# Patient Record
(demographics unavailable — no encounter records)

---

## 2025-07-30 NOTE — PHYSICAL EXAM
[Normal] : supple, no neck mass and thyroid not enlarged [Normal Neck Lymph Nodes] : normal neck lymph nodes  [Normal Supraclavicular Lymph Nodes] : normal supraclavicular lymph nodes [Normal Groin Lymph Nodes] : normal groin lymph nodes [Normal Axillary Lymph Nodes] : normal axillary lymph nodes [Normal] : oriented to person, place and time, with appropriate affect [de-identified] : no masses or adenopathy bilaterally.  us shows benign dilated ducts at area of concern in the left breast.  No active infection.

## 2025-07-30 NOTE — ASSESSMENT
[FreeTextEntry1] : S/p benign and concordant bilateral breast biopsy\par  Benign dilated ducts on final pathology \par  No active infection left breast\par  Continue yearly breast imaging November 2023\par  RTO 6 mos or prn\par  \par

## 2025-07-30 NOTE — PHYSICAL EXAM
[Normal] : supple, no neck mass and thyroid not enlarged [Normal Neck Lymph Nodes] : normal neck lymph nodes  [Normal Supraclavicular Lymph Nodes] : normal supraclavicular lymph nodes [Normal Groin Lymph Nodes] : normal groin lymph nodes [Normal Axillary Lymph Nodes] : normal axillary lymph nodes [Normal] : oriented to person, place and time, with appropriate affect [de-identified] : no masses or adenopathy bilaterally.  us shows benign dilated ducts at area of concern in the left breast.  No active infection.

## 2025-07-30 NOTE — HISTORY OF PRESENT ILLNESS
[de-identified] : Patient is a 52 y/o F who presents a follow up, last seen in February 2023.  She is s/p 3 site bilateral breast us-guided biopsy on 11/23/22, pathology was benign and concordant.   Recall L MMG/US 7/10/25 - No mammographic evidence of malignancy in the LEFT breast or suspicious sonographic abnormality in the lower inner quadrant or retroareolar location of the LEFT breast. Duct ectasia with intraductal debris in the patient directed area of concern in the LEFT breast 2:00 position. No localized fluid collection to suggest abscess. Recommend follow-up targeted LEFT breast ultrasound in 6 weeks to reevaluate for resolution of clinical symptoms. The patient is currently self treating for infection. Recommend clinical follow-up with further management based on clinical assessment. (BIRADS 3)  Screening MMG/US 5/16/25 - Nodular asymmetry left inferior breast MLO projection and sonographic left retroareolar mass for which additional mammographic and sonographic imaging is recommended. No right mammographic or sonographic malignancy. (TC: 28.0%, BIRADS 0)  Biopsy (11/23/22): 1. Breast, right, 12 o'clock retroareolar, ultrasound-guided core biopsy: - Fibrofatty breast tissue with fragments of benign cyst wall/dilated duct. 2. Breast, left, 3 o'clock 7 cm FN, ultrasound-guided core biopsy: - Fibrofatty breast tissue with fragments of benign cyst wall/dilated duct. - Fragments of duct ectasia. 3. Breast, left, 11 o'clock-1 o'clock retroareolar, ultrasound-guided core biopsy: - Breast tissue with marked acute inflammation, histiocytes and areas of necrosis.  FNA (11/23/22):  BREAST, LEFT, 11-1 O'CLOCK, FNA NEGATIVE FOR MALIGNANT CELLS  Screening bilateral mammo/sono (11/18/22):  At the palpable area of concern at the 11:00-1:00 retroareolar left breast region on sonography, multiple dilated ducts are seen containing internal material, along with a conglomerate mass of ducts and internal ductal material. While at least some of this material represents fluid, intraductal solid mass cannot entirely be excluded, particularly in the conglomerate mass mentioned above. US-guided core biopsy of this conglomerate mass is recommended.Intraductal appearing nodules by sonography at the 3:00 left breast and 12:00 right breast for which us-guided core biopsy is recommended respectively, as described above. The nodule at 3:00 on the left is also seen radiographically. No radiographic evidence of malignancy and no significant radiographic change on the right. Clinical follow-up is recommended to confirm complete resolution of the patient's symptoms of burning, redness and pain involving the left breast. (BI-RADS 4A)  Denies any family history of breast cancer.

## 2025-07-30 NOTE — HISTORY OF PRESENT ILLNESS
[de-identified] : Patient is a 52 y/o F who presents a follow up, last seen in February 2023.  She is s/p 3 site bilateral breast us-guided biopsy on 11/23/22, pathology was benign and concordant.   Recall L MMG/US 7/10/25 - No mammographic evidence of malignancy in the LEFT breast or suspicious sonographic abnormality in the lower inner quadrant or retroareolar location of the LEFT breast. Duct ectasia with intraductal debris in the patient directed area of concern in the LEFT breast 2:00 position. No localized fluid collection to suggest abscess. Recommend follow-up targeted LEFT breast ultrasound in 6 weeks to reevaluate for resolution of clinical symptoms. The patient is currently self treating for infection. Recommend clinical follow-up with further management based on clinical assessment. (BIRADS 3)  Screening MMG/US 5/16/25 - Nodular asymmetry left inferior breast MLO projection and sonographic left retroareolar mass for which additional mammographic and sonographic imaging is recommended. No right mammographic or sonographic malignancy. (TC: 28.0%, BIRADS 0)  Biopsy (11/23/22): 1. Breast, right, 12 o'clock retroareolar, ultrasound-guided core biopsy: - Fibrofatty breast tissue with fragments of benign cyst wall/dilated duct. 2. Breast, left, 3 o'clock 7 cm FN, ultrasound-guided core biopsy: - Fibrofatty breast tissue with fragments of benign cyst wall/dilated duct. - Fragments of duct ectasia. 3. Breast, left, 11 o'clock-1 o'clock retroareolar, ultrasound-guided core biopsy: - Breast tissue with marked acute inflammation, histiocytes and areas of necrosis.  FNA (11/23/22):  BREAST, LEFT, 11-1 O'CLOCK, FNA NEGATIVE FOR MALIGNANT CELLS  Screening bilateral mammo/sono (11/18/22):  At the palpable area of concern at the 11:00-1:00 retroareolar left breast region on sonography, multiple dilated ducts are seen containing internal material, along with a conglomerate mass of ducts and internal ductal material. While at least some of this material represents fluid, intraductal solid mass cannot entirely be excluded, particularly in the conglomerate mass mentioned above. US-guided core biopsy of this conglomerate mass is recommended.Intraductal appearing nodules by sonography at the 3:00 left breast and 12:00 right breast for which us-guided core biopsy is recommended respectively, as described above. The nodule at 3:00 on the left is also seen radiographically. No radiographic evidence of malignancy and no significant radiographic change on the right. Clinical follow-up is recommended to confirm complete resolution of the patient's symptoms of burning, redness and pain involving the left breast. (BI-RADS 4A)  Denies any family history of breast cancer.

## 2025-07-30 NOTE — CONSULT LETTER
[Dear  ___] : Dear  [unfilled], [Consult Letter:] : I had the pleasure of evaluating your patient, [unfilled]. [Please see my note below.] : Please see my note below. [Sincerely,] : Sincerely, [FreeTextEntry3] : Yanick Bryson MD FACS\par